# Patient Record
Sex: FEMALE | Race: WHITE | NOT HISPANIC OR LATINO | Employment: FULL TIME | URBAN - METROPOLITAN AREA
[De-identification: names, ages, dates, MRNs, and addresses within clinical notes are randomized per-mention and may not be internally consistent; named-entity substitution may affect disease eponyms.]

---

## 2023-08-09 ENCOUNTER — OFFICE VISIT (OUTPATIENT)
Dept: FAMILY MEDICINE CLINIC | Facility: CLINIC | Age: 23
End: 2023-08-09
Payer: COMMERCIAL

## 2023-08-09 VITALS
HEART RATE: 90 BPM | DIASTOLIC BLOOD PRESSURE: 72 MMHG | BODY MASS INDEX: 23.01 KG/M2 | TEMPERATURE: 97.8 F | HEIGHT: 67 IN | SYSTOLIC BLOOD PRESSURE: 114 MMHG | OXYGEN SATURATION: 99 % | WEIGHT: 146.6 LBS

## 2023-08-09 DIAGNOSIS — Z00.00 GENERAL MEDICAL EXAM: Primary | ICD-10-CM

## 2023-08-09 DIAGNOSIS — Z30.011 ENCOUNTER FOR INITIAL PRESCRIPTION OF CONTRACEPTIVE PILLS: ICD-10-CM

## 2023-08-09 PROCEDURE — 99385 PREV VISIT NEW AGE 18-39: CPT | Performed by: PHYSICIAN ASSISTANT

## 2023-08-09 RX ORDER — NORGESTIMATE AND ETHINYL ESTRADIOL 0.25-0.035
1 KIT ORAL DAILY
Qty: 28 TABLET | Refills: 11 | Status: SHIPPED | OUTPATIENT
Start: 2023-08-09

## 2023-08-09 NOTE — PROGRESS NOTES
Subjective   Petty Bello is a 23 y.o. female  Establish Care (Establish care, previous pcp Lloyd Moore from [facility]), Annual Exam (Annual physical, sees Dr. Talamantes for GYN/), and Adenopathy (Concerned about swollen lymph nodes on both sides of neck in June that lasted x2 weeks, but has not decreased )      Adenopathy  Associated symptoms include myalgias. Pertinent negatives include no arthralgias.   The patient was recently seen by her gynecologist for a lump on her right breast, but it was nothing significant. She had mononucleosis in high school and has had adenopathy posteriorly in her neck since then; however, she noticed adenopathy in the anterior lymph nodes in her neck for 1 month. She denies tenderness but 1 was firm with palpation. She vapes but is trying to quit, which she has done in past. She has had headaches. She denies any oral blisters, sores, or ulcers. The patient denies any sinus congestion. She denies any history of hernias, kidney problems, or gallbladder issues. The patient reports upper back and right shoulder pain associated with her job as a . She denies any history of scoliosis. She participated in swimming as a sport in high school.     The patient denies any pain or numbness in her right upper extremity. She generally eats healthy. She is drinking enough water.    She needs refills on her birth control pills, Ortho-Cyclen. She had reactions to the contraceptive patch previously. She did not have a Pap smear at her previous gynecology visit because she was on her menstrual cycle, but she had a breast examination.     The patient's grandfather passed away from lung cancer due to long-term history of smoking. She has a family history of heart disease.     Patient presents today for a preventive medical visit.  Patient is here to determine screening labs and tests that are due and to determine immunization status as well.  Patient will be counseled regarding preventative  medicine issues such as regular exercise and healthy diet as well.  Patient is a senior at [employer].  She works as a  at [employer] on [employer].  She is from [place].  The following portions of the patient's history were reviewed and updated as appropriate: allergies, current medications, past social history and problem list    Review of Systems   Constitutional: Negative.    HENT: Negative.     Eyes: Negative.    Respiratory: Negative.     Cardiovascular: Negative.    Gastrointestinal: Negative.    Endocrine: Negative.    Genitourinary: Negative.    Musculoskeletal:  Positive for myalgias. Negative for arthralgias.   Skin: Negative.    Allergic/Immunologic: Negative.    Neurological: Negative.    Hematological:  Positive for adenopathy.   Psychiatric/Behavioral: Negative.     All other systems reviewed and are negative.    Objective     Vitals:    08/09/23 1306   BP: 114/72   Pulse: 90   Temp: 97.8 øF (36.6 øC)   SpO2: 99%       Physical Exam  Vitals and nursing note reviewed.   Constitutional:       General: She is not in acute distress.     Appearance: Normal appearance. She is well-developed. She is not ill-appearing, toxic-appearing or diaphoretic.   HENT:      Head: Normocephalic and atraumatic.      Right Ear: External ear normal.      Left Ear: External ear normal.   Eyes:      Conjunctiva/sclera: Conjunctivae normal.      Pupils: Pupils are equal, round, and reactive to light.   Neck:      Thyroid: No thyromegaly.      Vascular: No carotid bruit or JVD.   Cardiovascular:      Rate and Rhythm: Normal rate and regular rhythm.      Pulses: Normal pulses.      Heart sounds: Normal heart sounds. No murmur heard.  Pulmonary:      Effort: Pulmonary effort is normal. No respiratory distress.      Breath sounds: Normal breath sounds.   Abdominal:      General: Bowel sounds are normal.      Palpations: Abdomen is soft. There is no mass.      Tenderness: There is no abdominal tenderness.    Musculoskeletal:         General: No swelling. Normal range of motion.      Cervical back: Normal range of motion and neck supple.   Lymphadenopathy:      Cervical: No cervical adenopathy.   Skin:     General: Skin is warm and dry.      Findings: No lesion or rash.   Neurological:      Mental Status: She is alert and oriented to person, place, and time.      Cranial Nerves: No cranial nerve deficit.      Sensory: No sensory deficit.      Motor: No weakness.      Coordination: Coordination normal.      Gait: Gait normal.      Deep Tendon Reflexes: Reflexes are normal and symmetric.   Psychiatric:         Mood and Affect: Mood normal.         Behavior: Behavior normal.         Thought Content: Thought content normal.         Judgment: Judgment normal.     Discussed preventative medicine issues with patient including regular exercise, healthy diet, stress reduction, adequate sleep and recommended age-appropriate screening studies.  Assessment & Plan     Diagnoses and all orders for this visit:    1. General medical exam (Primary)    2. Encounter for initial prescription of contraceptive pills    Other orders  -     norgestimate-ethinyl estradiol (ORTHO-CYCLEN) 0.25-35 MG-MCG per tablet; Take 1 tablet by mouth Daily.  Dispense: 28 tablet; Refill: 11     The patient is doing well overall. She is up to date on her preventative care. Her birth control pills, Ortho-Cyclen,  will be refilled. She was advised not to repeatedly touch her lymph nodes for at least 2 weeks because it may be causing them to feel sore. The patient was recommended to have a stretch routine for her shoulder and back pain. She can also apply an ice pack. If she experiences numbness, tingling, or a sensation resembling a pinched nerve in her upper extremity that is ongoing, physical therapy may be recommended.     Transcribed from ambient dictation for Deann De Anda PA-C by Mary Jo Gustafson.  08/09/23   14:40 EDT    Patient or patient representative  verbalized consent to the visit recording.  I have personally performed the services described in this document as transcribed by the above individual, and it is both accurate and complete.